# Patient Record
Sex: FEMALE | Race: WHITE | NOT HISPANIC OR LATINO | URBAN - METROPOLITAN AREA
[De-identification: names, ages, dates, MRNs, and addresses within clinical notes are randomized per-mention and may not be internally consistent; named-entity substitution may affect disease eponyms.]

---

## 2019-03-07 ENCOUNTER — IMPORTED ENCOUNTER (OUTPATIENT)
Dept: URBAN - METROPOLITAN AREA CLINIC 43 | Facility: CLINIC | Age: 79
End: 2019-03-07

## 2019-03-07 PROBLEM — H25.043: Noted: 2019-03-07

## 2019-03-07 PROBLEM — H43.813: Noted: 2019-03-07

## 2019-03-07 PROBLEM — H25.13: Noted: 2019-03-07

## 2019-03-07 PROBLEM — M31.6: Noted: 2019-03-07

## 2019-03-07 PROBLEM — H25.013: Noted: 2019-03-07

## 2019-03-12 ENCOUNTER — IMPORTED ENCOUNTER (OUTPATIENT)
Dept: URBAN - METROPOLITAN AREA CLINIC 43 | Facility: CLINIC | Age: 79
End: 2019-03-12

## 2020-04-19 ASSESSMENT — VISUAL ACUITY
OS_CC: 20/70 -2
OS_SC: 4'/100
OS_CC: J3
OD_OTHER: 20/400.
OD_SC: 3'/100
OS_SC: J7
OD_CC: J5
OS_OTHER: 20/400.
OD_SC: 20/400
OD_CC: 20/200

## 2020-04-19 ASSESSMENT — TONOMETRY
OD_IOP_MMHG: 18.0
OS_IOP_MMHG: 18.0

## 2020-04-19 ASSESSMENT — KERATOMETRY
OD_AXISANGLE_DEGREES: 46
OD_AXISANGLE2_DEGREES: 136
OS_AXISANGLE2_DEGREES: 57
OS_K2POWER_DIOPTERS: 45.25
OD_K2POWER_DIOPTERS: 45.5
OS_AXISANGLE_DEGREES: 147
OS_K1POWER_DIOPTERS: 45.5
OD_K1POWER_DIOPTERS: 45.75